# Patient Record
Sex: MALE | Race: ASIAN | NOT HISPANIC OR LATINO
[De-identification: names, ages, dates, MRNs, and addresses within clinical notes are randomized per-mention and may not be internally consistent; named-entity substitution may affect disease eponyms.]

---

## 2021-12-01 ENCOUNTER — APPOINTMENT (OUTPATIENT)
Dept: CARDIOLOGY | Facility: CLINIC | Age: 71
End: 2021-12-01
Payer: MEDICARE

## 2021-12-01 VITALS
HEART RATE: 87 BPM | SYSTOLIC BLOOD PRESSURE: 111 MMHG | WEIGHT: 174 LBS | HEIGHT: 68.9 IN | TEMPERATURE: 97.8 F | OXYGEN SATURATION: 95 % | BODY MASS INDEX: 25.77 KG/M2 | DIASTOLIC BLOOD PRESSURE: 60 MMHG | RESPIRATION RATE: 18 BRPM

## 2021-12-01 DIAGNOSIS — M32.9 SYSTEMIC LUPUS ERYTHEMATOSUS, UNSPECIFIED: ICD-10-CM

## 2021-12-01 DIAGNOSIS — Z01.810 ENCOUNTER FOR PREPROCEDURAL CARDIOVASCULAR EXAMINATION: ICD-10-CM

## 2021-12-01 DIAGNOSIS — I45.10 UNSPECIFIED RIGHT BUNDLE-BRANCH BLOCK: ICD-10-CM

## 2021-12-01 DIAGNOSIS — R94.31 ABNORMAL ELECTROCARDIOGRAM [ECG] [EKG]: ICD-10-CM

## 2021-12-01 DIAGNOSIS — Z82.49 FAMILY HISTORY OF ISCHEMIC HEART DISEASE AND OTHER DISEASES OF THE CIRCULATORY SYSTEM: ICD-10-CM

## 2021-12-01 DIAGNOSIS — N40.0 BENIGN PROSTATIC HYPERPLASIA WITHOUT LOWER URINARY TRACT SYMPMS: ICD-10-CM

## 2021-12-01 PROCEDURE — 99203 OFFICE O/P NEW LOW 30 MIN: CPT

## 2021-12-01 RX ORDER — TAMSULOSIN HYDROCHLORIDE 0.4 MG/1
0.4 CAPSULE ORAL
Refills: 0 | Status: ACTIVE | COMMUNITY

## 2021-12-01 RX ORDER — FINASTERIDE 5 MG/1
5 TABLET, FILM COATED ORAL
Refills: 0 | Status: ACTIVE | COMMUNITY

## 2021-12-01 NOTE — DISCUSSION/SUMMARY
[FreeTextEntry1] : 71 year-old male with BPH and lupus presents for cardiac clearance prior to cataract surgery.  Patient reports no cardiac history.  Patient denies CP, SOB, palpitations, or lightheadedness.  Patient was noted to have an abnormal ECG by PCP, showing RBBB.  I advised patient to undergo an echocardiogram.  I advised patient to undergo an echocardiogram.  \par \par (1) Cardiac clearance prior to cataract surgery - Patient reports no cardiac history.  He has an abnormal ECG, showing RBBB.  Patient denies exertional symptoms.  Patient reports good exercise tolerance.  Patient is therefore cleared for surgery and anesthesia. \par \par (2) Abnormal ECG, RBBB -  I advised patient to undergo an echocardiogram pending insurance authorization.  His cardiac clearance does not depend on the completion of echo.\par \par (3) Followup - pending echo.

## 2021-12-01 NOTE — HISTORY OF PRESENT ILLNESS
[FreeTextEntry1] : 71 year-old male with BPH and lupus presents for cardiac clearance prior to cataract surgery.  Patient reports no cardiac history.  Patient denies CP, SOB, palpitations, or lightheadedness.  Patient was noted to have an abnormal ECG by PCP, showing RBBB.  I advised patient to undergo an echocardiogram.  I advised patient to undergo an echocardiogram.

## 2024-05-07 ENCOUNTER — NON-APPOINTMENT (OUTPATIENT)
Age: 74
End: 2024-05-07

## 2024-05-07 ENCOUNTER — APPOINTMENT (OUTPATIENT)
Dept: CARDIOLOGY | Facility: CLINIC | Age: 74
End: 2024-05-07
Payer: MEDICARE

## 2024-05-07 VITALS
TEMPERATURE: 97.5 F | WEIGHT: 175 LBS | HEART RATE: 73 BPM | RESPIRATION RATE: 18 BRPM | SYSTOLIC BLOOD PRESSURE: 147 MMHG | OXYGEN SATURATION: 97 % | BODY MASS INDEX: 25.92 KG/M2 | DIASTOLIC BLOOD PRESSURE: 72 MMHG

## 2024-05-07 DIAGNOSIS — R55 SYNCOPE AND COLLAPSE: ICD-10-CM

## 2024-05-07 PROCEDURE — 93306 TTE W/DOPPLER COMPLETE: CPT

## 2024-05-07 PROCEDURE — 99214 OFFICE O/P EST MOD 30 MIN: CPT

## 2024-05-27 NOTE — REASON FOR VISIT
[FreeTextEntry1] : 73 year-old male with BPH, lupus, presents for followup.    Patient was last seen on 12/1/21 for cardiac clearance prior to cataract surgery.  Patient reports no cardiac history.  Patient denies CP, SOB, palpitations, or lightheadedness.  Patient was noted to have an abnormal ECG by PCP, showing RBBB.  I advised patient to undergo an echocardiogram.  I advised patient to undergo an echocardiogram
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
## 2024-06-06 ENCOUNTER — NON-APPOINTMENT (OUTPATIENT)
Age: 74
End: 2024-06-06